# Patient Record
Sex: FEMALE | Race: WHITE | Employment: OTHER | URBAN - METROPOLITAN AREA
[De-identification: names, ages, dates, MRNs, and addresses within clinical notes are randomized per-mention and may not be internally consistent; named-entity substitution may affect disease eponyms.]

---

## 2023-02-09 ENCOUNTER — HOSPITAL ENCOUNTER (OUTPATIENT)
Age: 83
Setting detail: SPECIMEN
Discharge: HOME | End: 2023-02-09

## 2023-02-09 ENCOUNTER — OFFICE VISIT (OUTPATIENT)
Dept: OBGYN CLINIC | Age: 83
End: 2023-02-09
Payer: MEDICARE

## 2023-02-09 VITALS
BODY MASS INDEX: 21.97 KG/M2 | DIASTOLIC BLOOD PRESSURE: 84 MMHG | HEIGHT: 63 IN | SYSTOLIC BLOOD PRESSURE: 154 MMHG | WEIGHT: 124 LBS

## 2023-02-09 DIAGNOSIS — R30.0 DYSURIA: ICD-10-CM

## 2023-02-09 DIAGNOSIS — R30.0 DYSURIA: Primary | ICD-10-CM

## 2023-02-09 PROCEDURE — 1123F ACP DISCUSS/DSCN MKR DOCD: CPT

## 2023-02-09 PROCEDURE — 99203 OFFICE O/P NEW LOW 30 MIN: CPT

## 2023-02-09 RX ORDER — OLMESARTAN MEDOXOMIL AND HYDROCHLOROTHIAZIDE 20/12.5 20; 12.5 MG/1; MG/1
TABLET ORAL
COMMUNITY
Start: 2022-11-19

## 2023-02-09 RX ORDER — ALPRAZOLAM 0.25 MG/1
TABLET ORAL
COMMUNITY
Start: 2022-12-22

## 2023-02-09 RX ORDER — ASCORBIC ACID 500 MG
500 TABLET ORAL DAILY
COMMUNITY

## 2023-02-09 RX ORDER — ACETAMINOPHEN 325 MG/1
650 TABLET ORAL EVERY 6 HOURS PRN
COMMUNITY
Start: 2016-04-01

## 2023-02-09 RX ORDER — DILTIAZEM HYDROCHLORIDE 180 MG/1
CAPSULE, COATED, EXTENDED RELEASE ORAL
COMMUNITY
Start: 2023-02-03

## 2023-02-09 RX ORDER — CYANOCOBALAMIN (VITAMIN B-12) 500 MCG
400 LOZENGE ORAL DAILY
COMMUNITY

## 2023-02-09 RX ORDER — ASPIRIN 81 MG/1
81 TABLET, CHEWABLE ORAL DAILY
COMMUNITY

## 2023-02-09 NOTE — PROGRESS NOTES
DATE OF VISIT:  2/9/23    PATIENT NAME:  Sloan Brunner     YOB: 1940    REASON FOR VISIT:    Chief Complaint   Patient presents with    Other     Patient believes she may have a prolapsed bladder. Sometimes she will have pain with urination. Symptoms started about 2 weeks ago. Denies pressure. HISTORY OF PRESENT ILLNESS:  Patient presents to [facility] care with dysuria x 2 weeks. Reports that pain is right around urethra. She did have frequency when symptoms first started. Denies hematuria, flank pain. Has not taken anything OTC to help. She denies vaginal symptoms. Concerned about possible prolapse because it is in her family hx but denies any bulge sensation or pressure. No LMP recorded. Patient is postmenopausal.  Vitals:    02/09/23 1303 02/09/23 1305   BP: (!) 154/84 (!) 154/84   Site: Right Upper Arm Left Upper Arm   Position: Sitting Sitting   Weight: 124 lb (56.2 kg)    Height: 5' 3\" (1.6 m)      Body mass index is 21.97 kg/m². Allergies   Allergen Reactions    Cipro [Ciprofloxacin Hcl]      Current Outpatient Medications   Medication Sig Dispense Refill    acetaminophen (TYLENOL) 325 MG tablet Take 650 mg by mouth every 6 hours as needed      ALPRAZolam (XANAX) 0.25 MG tablet TAKE 1/2 (ONE-HALF) TABLET BY MOUTH TWICE DAILY AS NEEDED      ascorbic acid (VITAMIN C) 500 MG tablet Take 500 mg by mouth daily      aspirin 81 MG chewable tablet Take 81 mg by mouth daily      dilTIAZem (CARDIZEM CD) 180 MG extended release capsule TAKE 1 CAPSULE BY MOUTH ONCE DAILY      olmesartan-hydroCHLOROthiazide (BENICAR HCT) 20-12.5 MG per tablet TAKE 1 TABLET BY MOUTH ONCE DAILY      Vitamin E 400 units TABS Take 400 mg by mouth daily      Ascorbic Acid (VITAMIN C PO) Take by mouth      Multiple Vitamin (MULTI-VITAMIN DAILY PO) Take by mouth       No current facility-administered medications for this visit.      Social History     Socioeconomic History    Marital status:  Spouse name: None    Number of children: None    Years of education: None    Highest education level: None   Tobacco Use    Smoking status: Former     Types: Cigarettes     Quit date:      Years since quittin.1    Smokeless tobacco: Never   Vaping Use    Vaping Use: Never used   Substance and Sexual Activity    Alcohol use: Not Currently    Drug use: Never       REVIEW OF SYSTEMS:  Review of Systems   Constitutional:  Negative for chills, fatigue and fever. Genitourinary:  Positive for dysuria and frequency. Negative for flank pain, hematuria, menstrual problem, pelvic pain, vaginal bleeding, vaginal discharge and vaginal pain. PHYSICAL EXAM:  BP (!) 154/84 (Site: Left Upper Arm, Position: Sitting)   Ht 5' 3\" (1.6 m)   Wt 124 lb (56.2 kg)   BMI 21.97 kg/m²   Physical Exam  Constitutional:       Appearance: Normal appearance. Genitourinary:      Vulva normal.      Right Labia: No rash, tenderness or lesions. Left Labia: No tenderness, lesions or rash. No vaginal discharge, tenderness or bleeding. Moderate vaginal atrophy present. Urethral meatus caruncle present. HENT:      Head: Normocephalic and atraumatic. Mouth/Throat:      Mouth: Mucous membranes are moist.   Eyes:      Extraocular Movements: Extraocular movements intact. Musculoskeletal:         General: Normal range of motion. Cervical back: Normal range of motion. Neurological:      General: No focal deficit present. Mental Status: She is alert and oriented to person, place, and time. Skin:     General: Skin is warm and dry. Psychiatric:         Mood and Affect: Mood normal.         Behavior: Behavior normal.         Thought Content: Thought content normal.     The patient, Lisa Fox is a 78 y.o. female, was seen with a total time spent of 30 minutes for the visit on this date of service by the E/M provider.  The time component had both face to face and non face to face time spent in determining the total time component. Counseling and education regarding her diagnosis listed below and her options regarding those diagnoses were also included in determining her time component. The patient had her preventative health maintenance recommendations and follow-up reviewed with her at the completion of her visit. ASSESSMENT:  1. Dysuria        PLAN:  Discussed urine culture to rule out infection. Discussed vaginal estrogen for possible urethral caruncle. No orders of the defined types were placed in this encounter. Return if symptoms worsen or fail to improve.        Electronically signed by Poli Julien PA-C on 02/09/23

## 2023-02-11 LAB
MICROORGANISM SPEC CULT: ABNORMAL
SPECIMEN DESCRIPTION: ABNORMAL

## 2023-02-13 RX ORDER — NITROFURANTOIN 25; 75 MG/1; MG/1
100 CAPSULE ORAL 2 TIMES DAILY
Qty: 10 CAPSULE | Refills: 0 | Status: SHIPPED | OUTPATIENT
Start: 2023-02-13 | End: 2023-02-18

## 2023-02-20 ENCOUNTER — TELEPHONE (OUTPATIENT)
Dept: OBGYN CLINIC | Age: 83
End: 2023-02-20

## 2023-02-20 RX ORDER — ESTRADIOL 0.1 MG/G
CREAM VAGINAL
Qty: 42.5 G | Refills: 0 | Status: SHIPPED | OUTPATIENT
Start: 2023-02-20

## 2023-02-20 NOTE — TELEPHONE ENCOUNTER
Pt called stating she was given Macrobid for UTI and has finished it but is not any better. Pt c/o urinary frequency and burning with urination. Pt is wondering if there is something else that can be prescribed. Please advise.

## 2023-02-28 ENCOUNTER — OFFICE VISIT (OUTPATIENT)
Dept: OBGYN CLINIC | Age: 83
End: 2023-02-28
Payer: MEDICARE

## 2023-02-28 ENCOUNTER — HOSPITAL ENCOUNTER (OUTPATIENT)
Age: 83
Setting detail: SPECIMEN
Discharge: HOME | End: 2023-02-28

## 2023-02-28 VITALS — DIASTOLIC BLOOD PRESSURE: 80 MMHG | SYSTOLIC BLOOD PRESSURE: 140 MMHG | BODY MASS INDEX: 21.97 KG/M2 | WEIGHT: 124 LBS

## 2023-02-28 DIAGNOSIS — R30.0 DYSURIA: ICD-10-CM

## 2023-02-28 DIAGNOSIS — R30.0 DYSURIA: Primary | ICD-10-CM

## 2023-02-28 PROCEDURE — 1123F ACP DISCUSS/DSCN MKR DOCD: CPT

## 2023-02-28 PROCEDURE — G8484 FLU IMMUNIZE NO ADMIN: HCPCS

## 2023-02-28 PROCEDURE — G8427 DOCREV CUR MEDS BY ELIG CLIN: HCPCS

## 2023-02-28 PROCEDURE — 99213 OFFICE O/P EST LOW 20 MIN: CPT

## 2023-02-28 PROCEDURE — G8420 CALC BMI NORM PARAMETERS: HCPCS

## 2023-02-28 PROCEDURE — G8400 PT W/DXA NO RESULTS DOC: HCPCS

## 2023-02-28 PROCEDURE — 1090F PRES/ABSN URINE INCON ASSESS: CPT

## 2023-02-28 PROCEDURE — 1036F TOBACCO NON-USER: CPT

## 2023-02-28 RX ORDER — VALSARTAN AND HYDROCHLOROTHIAZIDE 80; 12.5 MG/1; MG/1
TABLET, FILM COATED ORAL
COMMUNITY
Start: 2023-02-22

## 2023-02-28 NOTE — PROGRESS NOTES
DATE OF VISIT:  2/28/23    PATIENT NAME:  Ariadna Aaron     YOB: 1940    REASON FOR VISIT:    Chief Complaint   Patient presents with    Follow-up     Here for urine recheck, continues to have issues with dysuria and frequency. She does not think the medication helped at all. Continues to use vaginal cream.         HISTORY OF PRESENT ILLNESS:  Patient presents with ongoing dysuria, frequency. Denies hematuria, flank pain. She was recently treated with Macrobid for UTI and notes that symptoms have continued. She did start using vaginal estrogen cream for urethral caruncle about a week ago. Made aware that it can take ~2-4 weeks to notice improvement. She would like to have cx repeated to make sure infection is cleared up. She reports that she saw PCP last week and was told that all is well. No LMP recorded. Patient is postmenopausal.  Vitals:    02/28/23 1014 02/28/23 1038   BP: (!) 140/82 (!) 140/80   Weight: 124 lb (56.2 kg)      Body mass index is 21.97 kg/m². Allergies   Allergen Reactions    Cipro [Ciprofloxacin Hcl]      Current Outpatient Medications   Medication Sig Dispense Refill    valsartan-hydroCHLOROthiazide (DIOVAN-HCT) 80-12.5 MG per tablet TAKE 1 TABLET BY MOUTH ONCE DAILY      estradiol (ESTRACE VAGINAL) 0.1 MG/GM vaginal cream Place 1 g vaginally daily for 2 weeks, then 2-3 times weekly.  42.5 g 0    acetaminophen (TYLENOL) 325 MG tablet Take 650 mg by mouth every 6 hours as needed      ALPRAZolam (XANAX) 0.25 MG tablet TAKE 1/2 (ONE-HALF) TABLET BY MOUTH TWICE DAILY AS NEEDED      ascorbic acid (VITAMIN C) 500 MG tablet Take 500 mg by mouth daily      aspirin 81 MG chewable tablet Take 81 mg by mouth daily      dilTIAZem (CARDIZEM CD) 180 MG extended release capsule TAKE 1 CAPSULE BY MOUTH ONCE DAILY      Vitamin E 400 units TABS Take 400 mg by mouth daily      Ascorbic Acid (VITAMIN C PO) Take by mouth      Multiple Vitamin (MULTI-VITAMIN DAILY PO) Take by mouth No current facility-administered medications for this visit. Social History     Socioeconomic History    Marital status:    Tobacco Use    Smoking status: Former     Types: Cigarettes     Quit date:      Years since quittin.1    Smokeless tobacco: Never   Vaping Use    Vaping Use: Never used   Substance and Sexual Activity    Alcohol use: Not Currently    Drug use: Never       REVIEW OF SYSTEMS:  Review of Systems   Constitutional:  Negative for chills, fatigue and fever. Genitourinary:  Positive for dysuria and frequency. Negative for flank pain, hematuria, pelvic pain, vaginal bleeding and vaginal discharge. PHYSICAL EXAM:  BP (!) 140/80   Wt 124 lb (56.2 kg)   BMI 21.97 kg/m²   Physical Exam  Constitutional:       Appearance: Normal appearance. HENT:      Head: Normocephalic and atraumatic. Mouth/Throat:      Mouth: Mucous membranes are moist.   Eyes:      Extraocular Movements: Extraocular movements intact. Musculoskeletal:         General: Normal range of motion. Cervical back: Normal range of motion. Neurological:      General: No focal deficit present. Mental Status: She is alert and oriented to person, place, and time. Skin:     General: Skin is warm and dry. Psychiatric:         Mood and Affect: Mood normal.         Behavior: Behavior normal.         Thought Content: Thought content normal.     The patient, Ana Murillo is a 78 y.o. female, was seen with a total time spent of 20 minutes for the visit on this date of service by the E/M provider. The time component had both face to face and non face to face time spent in determining the total time component. Counseling and education regarding her diagnosis listed below and her options regarding those diagnoses were also included in determining her time component.       The patient had her preventative health maintenance recommendations and follow-up reviewed with her at the completion of her visit.      ASSESSMENT:  1. Dysuria        PLAN:  Orders Placed This Encounter   Procedures    Culture, Urine     Return if symptoms worsen or fail to improve.        Electronically signed by Eli Novoa PA-C on 02/28/23

## 2023-03-02 LAB
MICROORGANISM SPEC CULT: ABNORMAL
SPECIMEN DESCRIPTION: ABNORMAL

## 2023-03-02 RX ORDER — CEPHALEXIN 250 MG/1
250 CAPSULE ORAL 4 TIMES DAILY
Qty: 28 CAPSULE | Refills: 0 | Status: SHIPPED | OUTPATIENT
Start: 2023-03-02 | End: 2023-03-09